# Patient Record
Sex: FEMALE | Race: WHITE
[De-identification: names, ages, dates, MRNs, and addresses within clinical notes are randomized per-mention and may not be internally consistent; named-entity substitution may affect disease eponyms.]

---

## 2017-01-11 DIAGNOSIS — F33.1 MODERATE RECURRENT MAJOR DEPRESSION (H): Primary | ICD-10-CM

## 2017-01-11 RX ORDER — CITALOPRAM HYDROBROMIDE 20 MG/1
40 TABLET ORAL DAILY
Qty: 180 TABLET | Refills: 3 | Status: CANCELLED | OUTPATIENT
Start: 2017-01-11

## 2017-01-11 NOTE — TELEPHONE ENCOUNTER
CITALOPRAM 40MG     Last Written Prescription Date: 4/13/2015  Last Fill Quantity: 180, # refills: 3  Last Office Visit with G primary care provider:  4/13/2015        Last PHQ-9 score on record=   PHQ-9 SCORE 8/25/2015   Total Score -   Total Score 15

## 2017-01-11 NOTE — TELEPHONE ENCOUNTER
This patient is no longer seen here, the refill is disregarded after speaking to the pharmacy.   Patient needs an appointment if she would like it filled by Dr. Lynn.  Brina Lopez, RN  Triage Nurse

## 2018-06-17 ENCOUNTER — HEALTH MAINTENANCE LETTER (OUTPATIENT)
Age: 63
End: 2018-06-17